# Patient Record
Sex: MALE | Race: WHITE | NOT HISPANIC OR LATINO | URBAN - METROPOLITAN AREA
[De-identification: names, ages, dates, MRNs, and addresses within clinical notes are randomized per-mention and may not be internally consistent; named-entity substitution may affect disease eponyms.]

---

## 2019-12-08 ENCOUNTER — HOSPITAL ENCOUNTER (EMERGENCY)
Facility: HOSPITAL | Age: 54
Discharge: HOME | End: 2019-12-08
Attending: EMERGENCY MEDICINE
Payer: COMMERCIAL

## 2019-12-08 VITALS
BODY MASS INDEX: 28 KG/M2 | WEIGHT: 200 LBS | TEMPERATURE: 99 F | HEIGHT: 71 IN | RESPIRATION RATE: 20 BRPM | SYSTOLIC BLOOD PRESSURE: 109 MMHG | HEART RATE: 80 BPM | OXYGEN SATURATION: 97 % | DIASTOLIC BLOOD PRESSURE: 68 MMHG

## 2019-12-08 DIAGNOSIS — L30.9 DERMATITIS: Primary | ICD-10-CM

## 2019-12-08 PROCEDURE — 99284 EMERGENCY DEPT VISIT MOD MDM: CPT | Mod: 25

## 2019-12-08 PROCEDURE — 99284 EMERGENCY DEPT VISIT MOD MDM: CPT | Mod: ,,, | Performed by: EMERGENCY MEDICINE

## 2019-12-08 PROCEDURE — 96372 THER/PROPH/DIAG INJ SC/IM: CPT

## 2019-12-08 PROCEDURE — 63600175 PHARM REV CODE 636 W HCPCS: Performed by: EMERGENCY MEDICINE

## 2019-12-08 PROCEDURE — 99284 PR EMERGENCY DEPT VISIT,LEVEL IV: ICD-10-PCS | Mod: ,,, | Performed by: EMERGENCY MEDICINE

## 2019-12-08 RX ORDER — ALPRAZOLAM 0.5 MG/1
0.5 TABLET ORAL 3 TIMES DAILY
COMMUNITY
End: 2023-03-03 | Stop reason: SDUPTHER

## 2019-12-08 RX ORDER — METHYLPREDNISOLONE 4 MG/1
TABLET ORAL
Qty: 1 PACKAGE | Refills: 0 | Status: SHIPPED | OUTPATIENT
Start: 2019-12-08 | End: 2019-12-29

## 2019-12-08 RX ORDER — METHYLPREDNISOLONE SOD SUCC 125 MG
125 VIAL (EA) INJECTION
Status: COMPLETED | OUTPATIENT
Start: 2019-12-08 | End: 2019-12-08

## 2019-12-08 RX ORDER — DOXYCYCLINE 100 MG/1
100 CAPSULE ORAL 2 TIMES DAILY
Qty: 20 CAPSULE | Refills: 0 | Status: SHIPPED | OUTPATIENT
Start: 2019-12-08 | End: 2019-12-18

## 2019-12-08 RX ORDER — BUSPIRONE HYDROCHLORIDE 15 MG/1
15 TABLET ORAL 3 TIMES DAILY
COMMUNITY

## 2019-12-08 RX ORDER — EMTRICITABINE AND TENOFOVIR DISOPROXIL FUMARATE 200; 300 MG/1; MG/1
1 TABLET, FILM COATED ORAL DAILY
COMMUNITY

## 2019-12-08 RX ORDER — TRAZODONE HYDROCHLORIDE 100 MG/1
100 TABLET ORAL NIGHTLY
COMMUNITY

## 2019-12-08 RX ORDER — DULOXETIN HYDROCHLORIDE 30 MG/1
30 CAPSULE, DELAYED RELEASE ORAL DAILY
COMMUNITY

## 2019-12-08 RX ORDER — ATOMOXETINE 18 MG/1
18 CAPSULE ORAL DAILY
COMMUNITY
End: 2023-03-03 | Stop reason: SDUPTHER

## 2019-12-08 RX ORDER — FLUTICASONE PROPIONATE 50 MCG
1 SPRAY, SUSPENSION (ML) NASAL DAILY
COMMUNITY

## 2019-12-08 RX ADMIN — METHYLPREDNISOLONE SODIUM SUCCINATE 125 MG: 125 INJECTION, POWDER, FOR SOLUTION INTRAMUSCULAR; INTRAVENOUS at 06:12

## 2019-12-09 NOTE — ED PROVIDER NOTES
"Encounter Date: 12/8/2019    SCRIBE #1 NOTE: I, Rica Shaikh, am scribing for, and in the presence of,  Dr. Merida. I have scribed the following portions of the note - Other sections scribed: the HPI, ROS, and PE.       History     Chief Complaint   Patient presents with    Male  Problem     has been problem over and over about 12 times last yr, they give me steroid and antibiotics, rarely get admitted     Time patient was seen by the provider: 6:08 PM      The patient is a 54 y.o. male with co-morbidities including Crohn's disease and chronic hernia, who presents to the ED with a complaint of erythema to penis, testicles, and suprapubic region, since this morning, with associated swelling and pus. No fever or chills. He is here visiting family, and all of his medical records are in [place], [place]. He has had approximately 12 episodes of similar symptoms this year, without a diagnosis. His symptoms are thought to be "some type of dermatitis". He has been using hydrocortisone ointment. He is typically prescribed antibiotics and steroids for similar flares.     The history is provided by the patient.     Review of patient's allergies indicates:   Allergen Reactions    Neosporin (neomycin-polymyx) Rash     No past medical history on file.  No past surgical history on file.  No family history on file.  Social History     Tobacco Use    Smoking status: Not on file   Substance Use Topics    Alcohol use: Not on file    Drug use: Not on file     Review of Systems   Constitutional: Negative for chills and fever.   HENT: Negative for sore throat.    Eyes: Negative for visual disturbance.   Respiratory: Negative for shortness of breath.    Cardiovascular: Negative for chest pain.   Gastrointestinal: Negative for nausea.   Genitourinary: Negative for dysuria.        Positive for erythema, swelling, and pus to penis, testicles, and suprapubic region.   Musculoskeletal: Negative for back pain.   Skin: Negative for rash. "   Neurological: Negative for weakness.       Physical Exam     Initial Vitals [12/08/19 1755]   BP Pulse Resp Temp SpO2   120/70 90 18 98.2 °F (36.8 °C) 98 %      MAP       --         Physical Exam    Nursing note and vitals reviewed.  Constitutional: He appears well-developed and well-nourished. He is not diaphoretic. No distress.   HENT:   Head: Normocephalic and atraumatic.   Eyes: Conjunctivae and EOM are normal.   Neck: Normal range of motion. Neck supple.   Cardiovascular: Normal rate and regular rhythm.   No murmur heard.  Pulmonary/Chest: Breath sounds normal. No respiratory distress.   Abdominal: Soft. He exhibits no distension. There is no tenderness. There is no guarding.   Genitourinary:   Genitourinary Comments: Diffuse erythema to his scrotum, penis, and suprapubic region. Skin is very moist, though there is no purulent drainage noted from any region. Mild diffuse tenderness in this area. No area of crepitus or fluctuance.    Musculoskeletal: Normal range of motion.   Neurological: He is alert and oriented to person, place, and time.   Skin: Skin is warm and dry. No rash noted.         ED Course   Procedures  Labs Reviewed - No data to display       Imaging Results    None          Medical Decision Making:   History:   Old Medical Records: I decided to obtain old medical records.  Initial Assessment:   53 yo m, h/o crohn's, recurrent dermatitis to groin, followed by multiple specialists in [place], [place], here visiting daughter, c/o recurrent groin dermatitis x 1 day  No fever/chills/systemic sx    On exam, diffuse erythema/moist/wet skin to groin/penis/scrotum    Pt states that this is a recurrent skin condition and that a course of steroids and antibiotics always relieves sx    No signs of dangerous skin infectious like necrotizing fascitis at this time and pt not exhibiting any signs of systemic illness sothink it s reasonable to follow regimen that has proven successful for him in the past             Scribe Attestation:   Scribe #1: I performed the above scribed service and the documentation accurately describes the services I performed. I attest to the accuracy of the note.               I, Dr. Melissa Merida, personally performed the services described in this documentation. All medical record entries made by the scribe were at my direction and in my presence.  I have reviewed the chart and agree that the record reflects my personal performance and is accurate and complete. Melissa Merida MD.  6:22 PM 12/08/2019           Clinical Impression:       ICD-10-CM ICD-9-CM   1. Dermatitis L30.9 692.9         Disposition:   Disposition: Discharged  Condition: Stable                     Melissa Merida MD  12/08/19 1950

## 2023-03-03 ENCOUNTER — OFFICE VISIT (OUTPATIENT)
Dept: FAMILY MEDICINE | Facility: CLINIC | Age: 58
End: 2023-03-03
Payer: MEDICARE

## 2023-03-03 VITALS
OXYGEN SATURATION: 98 % | HEART RATE: 110 BPM | HEIGHT: 71 IN | BODY MASS INDEX: 34.54 KG/M2 | WEIGHT: 246.69 LBS | DIASTOLIC BLOOD PRESSURE: 82 MMHG | SYSTOLIC BLOOD PRESSURE: 138 MMHG

## 2023-03-03 DIAGNOSIS — J02.0 STREP PHARYNGITIS: Primary | ICD-10-CM

## 2023-03-03 DIAGNOSIS — M50.30 DDD (DEGENERATIVE DISC DISEASE), CERVICAL: ICD-10-CM

## 2023-03-03 DIAGNOSIS — F98.8 ATTENTION DEFICIT DISORDER (ADD) WITHOUT HYPERACTIVITY: Chronic | ICD-10-CM

## 2023-03-03 DIAGNOSIS — F41.1 GAD (GENERALIZED ANXIETY DISORDER): ICD-10-CM

## 2023-03-03 PROBLEM — Z90.49 S/P SMALL BOWEL RESECTION: Status: ACTIVE | Noted: 2021-09-01

## 2023-03-03 PROBLEM — E66.9 DIABETES MELLITUS TYPE 2 IN OBESE: Status: RESOLVED | Noted: 2018-10-16 | Resolved: 2023-03-03

## 2023-03-03 PROBLEM — N40.1 BENIGN PROSTATIC HYPERPLASIA WITH INCOMPLETE BLADDER EMPTYING: Status: ACTIVE | Noted: 2019-10-11

## 2023-03-03 PROBLEM — E66.9 DIABETES MELLITUS TYPE 2 IN OBESE: Status: ACTIVE | Noted: 2018-10-16

## 2023-03-03 PROBLEM — M54.12 CERVICAL RADICULOPATHY: Status: ACTIVE | Noted: 2022-05-26

## 2023-03-03 PROBLEM — R39.14 BENIGN PROSTATIC HYPERPLASIA WITH INCOMPLETE BLADDER EMPTYING: Chronic | Status: ACTIVE | Noted: 2019-10-11

## 2023-03-03 PROBLEM — Z87.19 S/P REPAIR OF RECURRENT VENTRAL HERNIA: Status: ACTIVE | Noted: 2019-12-27

## 2023-03-03 PROBLEM — F10.21 ALCOHOL USE DISORDER, SEVERE, IN SUSTAINED REMISSION: Chronic | Status: ACTIVE | Noted: 2020-05-06

## 2023-03-03 PROBLEM — F10.21 ALCOHOL USE DISORDER, SEVERE, IN SUSTAINED REMISSION: Status: ACTIVE | Noted: 2020-05-06

## 2023-03-03 PROBLEM — Z98.890 S/P REPAIR OF RECURRENT VENTRAL HERNIA: Status: ACTIVE | Noted: 2019-12-27

## 2023-03-03 PROBLEM — S46.111A LABRAL TEAR OF LONG HEAD OF RIGHT BICEPS TENDON: Status: RESOLVED | Noted: 2020-05-04 | Resolved: 2023-03-03

## 2023-03-03 PROBLEM — Z90.3 HISTORY OF SLEEVE GASTRECTOMY: Chronic | Status: ACTIVE | Noted: 2022-05-10

## 2023-03-03 PROBLEM — M54.12 CERVICAL RADICULOPATHY: Chronic | Status: ACTIVE | Noted: 2022-05-26

## 2023-03-03 PROBLEM — F43.10 POSTTRAUMATIC STRESS DISORDER: Chronic | Status: ACTIVE | Noted: 2020-05-06

## 2023-03-03 PROBLEM — S46.211A TEAR OF RIGHT BICEPS MUSCLE: Status: RESOLVED | Noted: 2020-05-28 | Resolved: 2023-03-03

## 2023-03-03 PROBLEM — N28.1 CYST OF LEFT KIDNEY: Status: RESOLVED | Noted: 2019-10-10 | Resolved: 2023-03-03

## 2023-03-03 PROBLEM — K43.9 VENTRAL HERNIA WITHOUT OBSTRUCTION OR GANGRENE: Status: RESOLVED | Noted: 2019-09-23 | Resolved: 2023-03-03

## 2023-03-03 PROBLEM — Z98.890 S/P REPAIR OF RECURRENT VENTRAL HERNIA: Chronic | Status: ACTIVE | Noted: 2019-12-27

## 2023-03-03 PROBLEM — E11.69 DIABETES MELLITUS TYPE 2 IN OBESE: Status: ACTIVE | Noted: 2018-10-16

## 2023-03-03 PROBLEM — Z90.3 HISTORY OF SLEEVE GASTRECTOMY: Status: ACTIVE | Noted: 2022-05-10

## 2023-03-03 PROBLEM — R39.14 BENIGN PROSTATIC HYPERPLASIA WITH INCOMPLETE BLADDER EMPTYING: Status: ACTIVE | Noted: 2019-10-11

## 2023-03-03 PROBLEM — Z87.19 S/P REPAIR OF RECURRENT VENTRAL HERNIA: Chronic | Status: ACTIVE | Noted: 2019-12-27

## 2023-03-03 PROBLEM — K43.2 INCISIONAL HERNIA: Status: RESOLVED | Noted: 2018-07-20 | Resolved: 2023-03-03

## 2023-03-03 PROBLEM — K43.9 VENTRAL HERNIA WITHOUT OBSTRUCTION OR GANGRENE: Status: ACTIVE | Noted: 2019-09-23

## 2023-03-03 PROBLEM — Z86.2 HISTORY OF IRON DEFICIENCY ANEMIA: Status: RESOLVED | Noted: 2021-05-11 | Resolved: 2023-03-03

## 2023-03-03 PROBLEM — Z86.2 HISTORY OF IRON DEFICIENCY ANEMIA: Status: ACTIVE | Noted: 2021-05-11

## 2023-03-03 PROBLEM — N28.1 CYST OF LEFT KIDNEY: Status: ACTIVE | Noted: 2019-10-10

## 2023-03-03 PROBLEM — S46.111A LABRAL TEAR OF LONG HEAD OF RIGHT BICEPS TENDON: Status: ACTIVE | Noted: 2020-05-04

## 2023-03-03 PROBLEM — Z98.84 S/P GASTRIC BYPASS: Status: ACTIVE | Noted: 2021-09-01

## 2023-03-03 PROBLEM — E11.69 DIABETES MELLITUS TYPE 2 IN OBESE: Status: RESOLVED | Noted: 2018-10-16 | Resolved: 2023-03-03

## 2023-03-03 PROBLEM — D50.8 IRON DEFICIENCY ANEMIA SECONDARY TO INADEQUATE DIETARY IRON INTAKE: Status: ACTIVE | Noted: 2018-01-16

## 2023-03-03 PROBLEM — E55.9 VITAMIN D DEFICIENCY: Status: ACTIVE | Noted: 2022-05-10

## 2023-03-03 PROBLEM — D50.9 IRON DEFICIENCY ANEMIA, UNSPECIFIED: Status: ACTIVE | Noted: 2018-01-16

## 2023-03-03 PROBLEM — Z90.49 S/P SMALL BOWEL RESECTION: Chronic | Status: ACTIVE | Noted: 2021-09-01

## 2023-03-03 PROBLEM — K43.2 INCISIONAL HERNIA: Status: ACTIVE | Noted: 2018-07-20

## 2023-03-03 PROBLEM — F43.10 POSTTRAUMATIC STRESS DISORDER: Status: ACTIVE | Noted: 2020-05-06

## 2023-03-03 PROBLEM — N40.1 BENIGN PROSTATIC HYPERPLASIA WITH INCOMPLETE BLADDER EMPTYING: Chronic | Status: ACTIVE | Noted: 2019-10-11

## 2023-03-03 PROBLEM — Z98.84 S/P GASTRIC BYPASS: Chronic | Status: ACTIVE | Noted: 2021-09-01

## 2023-03-03 PROBLEM — S46.211A TEAR OF RIGHT BICEPS MUSCLE: Status: ACTIVE | Noted: 2020-05-28

## 2023-03-03 PROCEDURE — 3075F PR MOST RECENT SYSTOLIC BLOOD PRESS GE 130-139MM HG: ICD-10-PCS | Mod: CPTII,S$GLB,, | Performed by: STUDENT IN AN ORGANIZED HEALTH CARE EDUCATION/TRAINING PROGRAM

## 2023-03-03 PROCEDURE — 99999 PR PBB SHADOW E&M-NEW PATIENT-LVL IV: CPT | Mod: PBBFAC,,, | Performed by: STUDENT IN AN ORGANIZED HEALTH CARE EDUCATION/TRAINING PROGRAM

## 2023-03-03 PROCEDURE — 3079F DIAST BP 80-89 MM HG: CPT | Mod: CPTII,S$GLB,, | Performed by: STUDENT IN AN ORGANIZED HEALTH CARE EDUCATION/TRAINING PROGRAM

## 2023-03-03 PROCEDURE — 99214 PR OFFICE/OUTPT VISIT, EST, LEVL IV, 30-39 MIN: ICD-10-PCS | Mod: S$GLB,,, | Performed by: STUDENT IN AN ORGANIZED HEALTH CARE EDUCATION/TRAINING PROGRAM

## 2023-03-03 PROCEDURE — 3008F BODY MASS INDEX DOCD: CPT | Mod: CPTII,S$GLB,, | Performed by: STUDENT IN AN ORGANIZED HEALTH CARE EDUCATION/TRAINING PROGRAM

## 2023-03-03 PROCEDURE — 1160F RVW MEDS BY RX/DR IN RCRD: CPT | Mod: CPTII,S$GLB,, | Performed by: STUDENT IN AN ORGANIZED HEALTH CARE EDUCATION/TRAINING PROGRAM

## 2023-03-03 PROCEDURE — 3079F PR MOST RECENT DIASTOLIC BLOOD PRESSURE 80-89 MM HG: ICD-10-PCS | Mod: CPTII,S$GLB,, | Performed by: STUDENT IN AN ORGANIZED HEALTH CARE EDUCATION/TRAINING PROGRAM

## 2023-03-03 PROCEDURE — 3008F PR BODY MASS INDEX (BMI) DOCUMENTED: ICD-10-PCS | Mod: CPTII,S$GLB,, | Performed by: STUDENT IN AN ORGANIZED HEALTH CARE EDUCATION/TRAINING PROGRAM

## 2023-03-03 PROCEDURE — 1160F PR REVIEW ALL MEDS BY PRESCRIBER/CLIN PHARMACIST DOCUMENTED: ICD-10-PCS | Mod: CPTII,S$GLB,, | Performed by: STUDENT IN AN ORGANIZED HEALTH CARE EDUCATION/TRAINING PROGRAM

## 2023-03-03 PROCEDURE — 99214 OFFICE O/P EST MOD 30 MIN: CPT | Mod: S$GLB,,, | Performed by: STUDENT IN AN ORGANIZED HEALTH CARE EDUCATION/TRAINING PROGRAM

## 2023-03-03 PROCEDURE — 1159F PR MEDICATION LIST DOCUMENTED IN MEDICAL RECORD: ICD-10-PCS | Mod: CPTII,S$GLB,, | Performed by: STUDENT IN AN ORGANIZED HEALTH CARE EDUCATION/TRAINING PROGRAM

## 2023-03-03 PROCEDURE — 1159F MED LIST DOCD IN RCRD: CPT | Mod: CPTII,S$GLB,, | Performed by: STUDENT IN AN ORGANIZED HEALTH CARE EDUCATION/TRAINING PROGRAM

## 2023-03-03 PROCEDURE — 99999 PR PBB SHADOW E&M-NEW PATIENT-LVL IV: ICD-10-PCS | Mod: PBBFAC,,, | Performed by: STUDENT IN AN ORGANIZED HEALTH CARE EDUCATION/TRAINING PROGRAM

## 2023-03-03 PROCEDURE — 3075F SYST BP GE 130 - 139MM HG: CPT | Mod: CPTII,S$GLB,, | Performed by: STUDENT IN AN ORGANIZED HEALTH CARE EDUCATION/TRAINING PROGRAM

## 2023-03-03 RX ORDER — FOLIC ACID 1 MG/1
1 TABLET ORAL
COMMUNITY

## 2023-03-03 RX ORDER — BUPROPION HYDROCHLORIDE 150 MG/1
150 TABLET ORAL EVERY MORNING
COMMUNITY
Start: 2023-01-18

## 2023-03-03 RX ORDER — AMOXICILLIN AND CLAVULANATE POTASSIUM 875; 125 MG/1; MG/1
1 TABLET, FILM COATED ORAL EVERY 12 HOURS
Qty: 20 TABLET | Refills: 0 | Status: SHIPPED | OUTPATIENT
Start: 2023-03-03 | End: 2023-03-13

## 2023-03-03 RX ORDER — ALPRAZOLAM 0.5 MG/1
0.5 TABLET ORAL 2 TIMES DAILY
Qty: 60 TABLET | Refills: 2 | Status: SHIPPED | OUTPATIENT
Start: 2023-03-03 | End: 2023-06-01

## 2023-03-03 RX ORDER — METHYLPHENIDATE HYDROCHLORIDE 20 MG/1
20 TABLET ORAL 2 TIMES DAILY
COMMUNITY
Start: 2023-01-20 | End: 2023-03-03 | Stop reason: SDUPTHER

## 2023-03-03 RX ORDER — LANOLIN ALCOHOL/MO/W.PET/CERES
400 CREAM (GRAM) TOPICAL
COMMUNITY

## 2023-03-03 RX ORDER — MECOBAL/LEVOMEFOLAT CA/B6 PHOS 2-3-35 MG
1 TABLET ORAL 2 TIMES DAILY
COMMUNITY
Start: 2022-09-22

## 2023-03-03 RX ORDER — METHOTREXATE 2.5 MG/1
20 TABLET ORAL
COMMUNITY
Start: 2022-11-14

## 2023-03-03 RX ORDER — TAMSULOSIN HYDROCHLORIDE 0.4 MG/1
0.4 CAPSULE ORAL DAILY
COMMUNITY

## 2023-03-03 RX ORDER — CEFDINIR 300 MG/1
300 CAPSULE ORAL 2 TIMES DAILY
COMMUNITY
Start: 2023-02-22 | End: 2023-03-03

## 2023-03-03 RX ORDER — CYANOCOBALAMIN 1000 UG/ML
1000 INJECTION, SOLUTION INTRAMUSCULAR; SUBCUTANEOUS
COMMUNITY
Start: 2022-05-10 | End: 2023-03-03 | Stop reason: SDUPTHER

## 2023-03-03 RX ORDER — BUPROPION HYDROCHLORIDE 300 MG/1
300 TABLET ORAL DAILY
COMMUNITY
Start: 2022-12-14

## 2023-03-03 RX ORDER — BUPROPION HYDROCHLORIDE 100 MG/1
TABLET, EXTENDED RELEASE ORAL
COMMUNITY
End: 2023-03-03 | Stop reason: SDUPTHER

## 2023-03-03 RX ORDER — CYANOCOBALAMIN 1000 UG/ML
1000 INJECTION, SOLUTION INTRAMUSCULAR; SUBCUTANEOUS
Qty: 2 ML | Refills: 2 | Status: SHIPPED | OUTPATIENT
Start: 2023-03-03 | End: 2023-04-20

## 2023-03-03 RX ORDER — ATORVASTATIN CALCIUM 20 MG/1
20 TABLET, FILM COATED ORAL DAILY
COMMUNITY

## 2023-03-03 RX ORDER — ARIPIPRAZOLE 5 MG/1
5 TABLET ORAL NIGHTLY
COMMUNITY

## 2023-03-03 RX ORDER — FEXOFENADINE HCL 60 MG
60 TABLET ORAL
COMMUNITY

## 2023-03-03 RX ORDER — METHYLPHENIDATE HYDROCHLORIDE 20 MG/1
30 TABLET ORAL 2 TIMES DAILY
Qty: 30 TABLET | Refills: 0 | Status: SHIPPED | OUTPATIENT
Start: 2023-03-03 | End: 2023-04-02

## 2023-03-03 RX ORDER — AZELASTINE 1 MG/ML
2 SPRAY, METERED NASAL DAILY
COMMUNITY
Start: 2022-06-19

## 2023-03-03 RX ORDER — POTASSIUM CHLORIDE 750 MG/1
10 TABLET, EXTENDED RELEASE ORAL
COMMUNITY

## 2023-03-03 RX ORDER — POTASSIUM CHLORIDE 750 MG/1
40 CAPSULE, EXTENDED RELEASE ORAL
COMMUNITY
End: 2023-03-03 | Stop reason: SDUPTHER

## 2023-03-03 RX ORDER — AZATHIOPRINE 50 MG/1
150 TABLET ORAL DAILY
COMMUNITY

## 2023-03-03 RX ORDER — TRAZODONE HYDROCHLORIDE 150 MG/1
300 TABLET ORAL NIGHTLY
COMMUNITY
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
## 2023-03-10 ENCOUNTER — OFFICE VISIT (OUTPATIENT)
Dept: PAIN MEDICINE | Facility: CLINIC | Age: 58
End: 2023-03-10
Payer: MEDICARE

## 2023-03-10 VITALS
SYSTOLIC BLOOD PRESSURE: 122 MMHG | HEART RATE: 91 BPM | HEIGHT: 71 IN | BODY MASS INDEX: 34.35 KG/M2 | WEIGHT: 245.38 LBS | DIASTOLIC BLOOD PRESSURE: 78 MMHG

## 2023-03-10 DIAGNOSIS — M54.12 CERVICAL RADICULOPATHY: ICD-10-CM

## 2023-03-10 DIAGNOSIS — M54.2 CERVICALGIA: Primary | ICD-10-CM

## 2023-03-10 DIAGNOSIS — M50.30 DDD (DEGENERATIVE DISC DISEASE), CERVICAL: ICD-10-CM

## 2023-03-10 PROCEDURE — 3008F BODY MASS INDEX DOCD: CPT | Mod: CPTII,S$GLB,, | Performed by: PHYSICIAN ASSISTANT

## 2023-03-10 PROCEDURE — 1160F RVW MEDS BY RX/DR IN RCRD: CPT | Mod: CPTII,S$GLB,, | Performed by: PHYSICIAN ASSISTANT

## 2023-03-10 PROCEDURE — 3078F DIAST BP <80 MM HG: CPT | Mod: CPTII,S$GLB,, | Performed by: PHYSICIAN ASSISTANT

## 2023-03-10 PROCEDURE — 99999 PR PBB SHADOW E&M-EST. PATIENT-LVL V: ICD-10-PCS | Mod: PBBFAC,,, | Performed by: PHYSICIAN ASSISTANT

## 2023-03-10 PROCEDURE — 1159F PR MEDICATION LIST DOCUMENTED IN MEDICAL RECORD: ICD-10-PCS | Mod: CPTII,S$GLB,, | Performed by: PHYSICIAN ASSISTANT

## 2023-03-10 PROCEDURE — 3074F SYST BP LT 130 MM HG: CPT | Mod: CPTII,S$GLB,, | Performed by: PHYSICIAN ASSISTANT

## 2023-03-10 PROCEDURE — 3008F PR BODY MASS INDEX (BMI) DOCUMENTED: ICD-10-PCS | Mod: CPTII,S$GLB,, | Performed by: PHYSICIAN ASSISTANT

## 2023-03-10 PROCEDURE — 1160F PR REVIEW ALL MEDS BY PRESCRIBER/CLIN PHARMACIST DOCUMENTED: ICD-10-PCS | Mod: CPTII,S$GLB,, | Performed by: PHYSICIAN ASSISTANT

## 2023-03-10 PROCEDURE — 99203 PR OFFICE/OUTPT VISIT, NEW, LEVL III, 30-44 MIN: ICD-10-PCS | Mod: S$GLB,,, | Performed by: PHYSICIAN ASSISTANT

## 2023-03-10 PROCEDURE — 99999 PR PBB SHADOW E&M-EST. PATIENT-LVL V: CPT | Mod: PBBFAC,,, | Performed by: PHYSICIAN ASSISTANT

## 2023-03-10 PROCEDURE — 1159F MED LIST DOCD IN RCRD: CPT | Mod: CPTII,S$GLB,, | Performed by: PHYSICIAN ASSISTANT

## 2023-03-10 PROCEDURE — 3078F PR MOST RECENT DIASTOLIC BLOOD PRESSURE < 80 MM HG: ICD-10-PCS | Mod: CPTII,S$GLB,, | Performed by: PHYSICIAN ASSISTANT

## 2023-03-10 PROCEDURE — 3074F PR MOST RECENT SYSTOLIC BLOOD PRESSURE < 130 MM HG: ICD-10-PCS | Mod: CPTII,S$GLB,, | Performed by: PHYSICIAN ASSISTANT

## 2023-03-10 PROCEDURE — 99203 OFFICE O/P NEW LOW 30 MIN: CPT | Mod: S$GLB,,, | Performed by: PHYSICIAN ASSISTANT

## 2023-03-10 NOTE — PROGRESS NOTES
[facility] New [facility] Evaluation      Referred by: Dr. Yuli Luo    PCP:   Yuli Luo MD    CC:   Chief Complaint   Patient presents with    Neck Pain    Numbness     Radiating down both arms, L>R    Shoulder Pain     Radiates down the left shoulder and arm.      No flowsheet data found.      HPI:   Martin Loyola is a 58 y.o. male with history of EtOH abuse, ADD, anxiety, depression, PTSD, psoriasis, fibromyalgia, h/o gastric sleeve surgery, chron's disease presents with neck and left arm pain.  He has had some chronic neck pain and tightmess for years.  Pain is felt in the posterior neck and left shoulder, arm to the hand.  It is associated with numbness/ tingling.  Arm pain is most intense around the biceps/ triceop areas.  He has similar but significantly less intense symptoms in the right arm.  Pain affects sleep quality.  He has tried tylenol, ibuprofen, PT and a muscle relaxant with no benefit.      Past and current medications:  Antineuropathics:  NSAIDs:  ibuprofen  Antidepressants: wellbutrin, cymbalta  Muscle relaxers: in past, unsure name  Opioids:  Antiplatelets/Anticoagulants:  Others: tylenol    Physical therapy/ Chiropractic care:  PT - last summer 2022 with no significant improvement; typically in more pain after PT than prior.  Did continue with home exercises, but exercises are now making pain worse as well    Pain Intervention History:  none    Past Spine Surgical History:  none      History:    Current Outpatient Medications:     ALPRAZolam (XANAX) 0.5 MG tablet, Take 1 tablet (0.5 mg total) by mouth 2 (two) times a day., Disp: 60 tablet, Rfl: 2    amoxicillin-clavulanate 875-125mg (AUGMENTIN) 875-125 mg per tablet, Take 1 tablet by mouth every 12 (twelve) hours. for 10 days, Disp: 20 tablet, Rfl: 0    ARIPiprazole (ABILIFY) 5 MG Tab, Take 5 mg by mouth every evening., Disp: , Rfl:     atorvastatin (LIPITOR) 20 MG tablet, Take 20 mg by mouth Daily., Disp: , Rfl:      "azaTHIOprine (IMURAN) 50 mg Tab, Take 150 mg by mouth Daily., Disp: , Rfl:     azelastine (ASTELIN) 137 mcg (0.1 %) nasal spray, 2 sprays by Nasal route Daily., Disp: , Rfl:     buPROPion (WELLBUTRIN XL) 150 MG TB24 tablet, Take 150 mg by mouth every morning., Disp: , Rfl:     buPROPion (WELLBUTRIN XL) 300 MG 24 hr tablet, Take 300 mg by mouth Daily., Disp: , Rfl:     busPIRone (BUSPAR) 15 MG tablet, Take 15 mg by mouth 3 (three) times daily., Disp: , Rfl:     calcium carbonate 1250 MG capsule, Take 1,250 mg by mouth 2 (two) times a day., Disp: , Rfl:     cyanocobalamin 1,000 mcg/mL injection, Inject 1 mL (1,000 mcg total) into the muscle every 14 (fourteen) days., Disp: 2 mL, Rfl: 2    DULoxetine (CYMBALTA) 30 MG capsule, Take 30 mg by mouth once daily., Disp: , Rfl:     emtricitabine-tenofovir 200-300 mg (TRUVADA) 200-300 mg Tab, Take 1 tablet by mouth once daily., Disp: , Rfl:     fexofenadine (ALLEGRA) 60 MG tablet, Take 60 mg by mouth., Disp: , Rfl:     fluticasone propionate (FLONASE) 50 mcg/actuation nasal spray, 1 spray by Each Nostril route once daily., Disp: , Rfl:     folic acid (FOLVITE) 1 MG tablet, Take 1 mg by mouth., Disp: , Rfl:     magnesium oxide (MAG-OX) 400 mg (241.3 mg magnesium) tablet, Take 400 mg by mouth., Disp: , Rfl:     mecobal-levomefolat Ca-B6 phos (L-METHYL-B6-B12) 3-35-2 mg Tab, Take 1 tablet by mouth 2 (two) times a day., Disp: , Rfl:     methotrexate 2.5 MG Tab, Take 20 mg by mouth every 7 days., Disp: , Rfl:     methylphenidate HCl (RITALIN) 20 MG tablet, Take 1.5 tablets (30 mg total) by mouth 2 (two) times daily., Disp: 30 tablet, Rfl: 0    MULTIVITAMIN ORAL, Take 1 tablet by mouth Daily., Disp: , Rfl:     potassium chloride SA (K-DUR,KLOR-CON M) 10 MEQ tablet, Take 10 mEq by mouth., Disp: , Rfl:     syringe with needle (BD LUER-ZOEY SYRINGE) 3 mL 25 x 1 1/2 " Syrg, 1 each by Misc.(Non-Drug; Combo Route) route every 14 (fourteen) days., Disp: 10 each, Rfl: 11    tamsulosin " (FLOMAX) 0.4 mg Cap, Take 0.4 mg by mouth Daily., Disp: , Rfl:     traZODone (DESYREL) 100 MG tablet, Take 100 mg by mouth every evening., Disp: , Rfl:     traZODone (DESYREL) 150 MG tablet, Take 300 mg by mouth every evening., Disp: , Rfl:     Past Medical History:   Diagnosis Date    Alcohol use disorder, severe, in sustained remission 5/6/2020    Astigmatism 11/28/2016    Attention deficit disorder 6/2/2016    Benign prostatic hyperplasia with incomplete bladder emptying 10/11/2019    Cervical radiculopathy 5/26/2022    Crohn's disease of ileum 11/12/2012    Cyst of left kidney 10/10/2019    Diabetes mellitus type 2 in obese 10/16/2018    Diverticulosis of intestine 6/2/2016    Dry eye syndrome 11/28/2016    Eczema 6/2/2016    Fibromyalgia 6/2/2016    HANANE (generalized anxiety disorder) 3/3/2023    Hypercholesterolemia 6/2/2016    Iron deficiency anemia secondary to inadequate dietary iron intake 1/16/2018    Labral tear of long head of right biceps tendon 5/4/2020    Nuclear sclerosis 11/28/2016    Plantar fasciitis 6/2/2016    Posttraumatic stress disorder 5/6/2020    Psoriasis 6/2/2016    Recurrent major depressive disorder, in partial remission 6/2/2016    S/P repair of recurrent ventral hernia 12/27/2019    S/P small bowel resection 9/1/2021    Vitamin D deficiency 5/10/2022       History reviewed. No pertinent surgical history.    History reviewed. No pertinent family history.    Social History     Socioeconomic History    Marital status: Single   Tobacco Use    Smoking status: Never    Smokeless tobacco: Never       Review of patient's allergies indicates:   Allergen Reactions    Hay fever and allergy relief Other (See Comments)     Seasonal    SAYS HE IS NOT ALLERGIC TO THESE DRUGS    Benzalkonium chloride Other (See Comments)     Blisters    Neosporin (neomycin-polymyx) Rash       Review of Systems:  Neck pain.  Left arm pain, numbness.  Balance of review of systems is negative.    Physical Exam:  Vitals:  "   03/10/23 0857   BP: 122/78   Pulse: 91   Weight: 111.3 kg (245 lb 6 oz)   Height: 5' 11" (1.803 m)   PainSc:   4   PainLoc: Arm     Body mass index is 34.22 kg/m².    Gen: NAD  Psych: mood appropriate for given condition  HEENT: eyes anicteric   CV: RRR, 2+ radial pulse  HEENT: anicteric   Respiratory: non-labored, no signs of respiratory distress  Abd: non-distended  Skin: warm, dry and intact.  Gait: Able to heel walk, toe walk. No antalgic gait.     Coordination:   Romberg: negative  Finger to nose coordination: normal  Heel to shin coordination: normal  Tandem walking coordination: normal    Cervical spine:   ROM is full in flexion, extension and lateral rotation without increased pain.  Spurling's maneuver causes no neck pain to either side.  Myofascial exam: No Tenderness to palpation across cervical paraspinous region bilaterally.    Lumbar spine:   ROM is full with flexion extension and oblique extension with no increased pain.    Zeeshan's test causes no increased pain on either side.    Supine straight leg raise is negative bilaterally.    Internal and external rotation of the hip causes no increased pain on either side.  Myofascial exam: No tenderness to palpation across lumbar paraspinous muscles. No tenderness to palpation over the bilateral greater trochanters and bilateral SI joint    Sensory:  Intact and symmetrical to light touch in C4-T1 dermatomes bilaterally. Intact and symmetrical to light touch in L1-S1 dermatomes bilaterally.    Motor:    Right Left   C4 Shoulder Abduction  5  5   C5 Elbow Flexion    5  5   C6 Wrist Extension  5  5   C7 Elbow Extension   5  5   C8/T1 Hand Intrinsics   5  5        Right Left   L2/3 Iliacus Hip flexion  5  5   L3/4 Qudratus Femoris Knee Extension  5  5   L4/5 Tib Anterior Ankle Dorsiflexion   5  5   L5/S1 Extensor Hallicus Longus Great toe extension  5  5   S1/S2 Gastroc/Soleus Plantar Flexion  5  5      Right Left   Triceps DTR 2+ 2+   Biceps DTR 2+ 2+ "   Brachioradialis DTR 2+ 2+   Patellar DTR 2+ 2+   Achilles DTR 2+ 2+   Gramajo Absent  Absent   Clonus Absent Absent   Babinski Absent Absent     Imaging:    None of the spine    Labs:  No results found for: LABA1C, HGBA1C    No results found for: WBC, HGB, HCT, MCV, PLT        Assessment:     Mr. Salazar has chronic neck pain with left arm radiculopathy.  Less severe symptoms on the right.  C6, C7 radiculopathy.  With no neurological deficitns and no benefit with conservative medications, PT, then I recommend imaging (xray and MRI ) to consisder interventional pain procedures.  Follow up after imaging.    Follow Up: RTC after imaging.    : Not applicable      Problem List Items Addressed This Visit       Cervical radiculopathy (Chronic)    Relevant Orders    MRI Cervical Spine Without Contrast    X-Ray Cervical Spine 5 View W Flex Extxt    DDD (degenerative disc disease), cervical    Relevant Orders    MRI Cervical Spine Without Contrast    X-Ray Cervical Spine 5 View W Flex Extxt     Other Visit Diagnoses       Cervicalgia    -  Primary    Relevant Orders    MRI Cervical Spine Without Contrast    X-Ray Cervical Spine 5 View W Flex Extxt                        Kandi Calderon PA-C  [facility]      This note was completed with dictation software and grammatical errors may exist.

## 2023-03-14 ENCOUNTER — TELEPHONE (OUTPATIENT)
Dept: FAMILY MEDICINE | Facility: CLINIC | Age: 58
End: 2023-03-14
Payer: MEDICARE

## 2023-03-14 NOTE — TELEPHONE ENCOUNTER
Pt states his symptoms have returned and he is having sore throat, ear fullness and popping, chest congestion, chills.  Symptoms restarted 3/12/2022. Please advise

## 2023-03-15 NOTE — TELEPHONE ENCOUNTER
Pt reports feeling much better. States he will call back if anything changes. No other concerns were voiced

## 2024-03-04 ENCOUNTER — TELEPHONE (OUTPATIENT)
Dept: GASTROENTEROLOGY | Facility: CLINIC | Age: 59
End: 2024-03-04
Payer: MEDICARE

## 2024-03-04 NOTE — TELEPHONE ENCOUNTER
----- Message from Beverley Penn sent at 3/4/2024  2:58 PM CST -----  Regarding: apt  Contact: [phone]  #Pt is calling to schedule a appt, pt referral is in media manger please call to discuss Further  pt  second attempted to schedule  this apt

## 2024-03-11 ENCOUNTER — TELEPHONE (OUTPATIENT)
Dept: GASTROENTEROLOGY | Facility: CLINIC | Age: 59
End: 2024-03-11
Payer: MEDICARE

## 2024-03-11 NOTE — TELEPHONE ENCOUNTER
----- Message from Mis Bond RN sent at 3/11/2024 12:32 PM CDT -----  Regarding: FW: Pt Advice  Contact: Pt [phone]    ----- Message -----  From: Grant Gallagher  Sent: 3/11/2024  10:02 AM CDT  To: [facility] Staff  Subject: Pt Advice                                        Pt calling regarding scheduling new pt referral(Dr. Ever Mantilla) appt dx: Crohn's Disease. Has left several messages for a call. Would like to ask provider questions regarding medication or surgery. Please call [phone]

## 2024-03-11 NOTE — TELEPHONE ENCOUNTER
LATRELL for callback [phone]  - Informed no NP availability at this time & to contact referring MD for recs

## 2024-07-09 ENCOUNTER — PATIENT MESSAGE (OUTPATIENT)
Dept: ADMINISTRATIVE | Facility: HOSPITAL | Age: 59
End: 2024-07-09
Payer: MEDICARE

## 2025-08-19 ENCOUNTER — PATIENT MESSAGE (OUTPATIENT)
Dept: ADMINISTRATIVE | Facility: HOSPITAL | Age: 60
End: 2025-08-19
Payer: MEDICARE